# Patient Record
Sex: FEMALE | Race: WHITE | NOT HISPANIC OR LATINO | Employment: FULL TIME | URBAN - METROPOLITAN AREA
[De-identification: names, ages, dates, MRNs, and addresses within clinical notes are randomized per-mention and may not be internally consistent; named-entity substitution may affect disease eponyms.]

---

## 2021-08-30 ENCOUNTER — OFFICE VISIT (OUTPATIENT)
Dept: NEUROSURGERY | Facility: CLINIC | Age: 21
End: 2021-08-30

## 2021-08-30 VITALS
SYSTOLIC BLOOD PRESSURE: 120 MMHG | WEIGHT: 173 LBS | BODY MASS INDEX: 29.53 KG/M2 | DIASTOLIC BLOOD PRESSURE: 74 MMHG | HEIGHT: 64 IN

## 2021-08-30 DIAGNOSIS — M51.16 LUMBAR DISC HERNIATION WITH RADICULOPATHY: Primary | ICD-10-CM

## 2021-08-30 PROCEDURE — 99204 OFFICE O/P NEW MOD 45 MIN: CPT | Performed by: NEUROLOGICAL SURGERY

## 2021-08-30 RX ORDER — MULTIPLE VITAMINS W/ MINERALS TAB 9MG-400MCG
1 TAB ORAL DAILY
COMMUNITY
End: 2022-05-31 | Stop reason: HOSPADM

## 2021-08-30 RX ORDER — MELOXICAM 15 MG/1
15 TABLET ORAL DAILY
COMMUNITY
Start: 2021-06-22 | End: 2022-03-08

## 2021-08-30 RX ORDER — METHOCARBAMOL 750 MG/1
750 TABLET, FILM COATED ORAL EVERY 8 HOURS
COMMUNITY
Start: 2021-06-22 | End: 2022-03-08

## 2021-08-30 RX ORDER — IBUPROFEN 200 MG
800 TABLET ORAL EVERY 6 HOURS PRN
COMMUNITY

## 2021-08-30 NOTE — PROGRESS NOTES
NAME: JAIME FRY   DOS: 2021  : 2000  PCP: Liz Varghese MD    Chief Complaint:    Chief Complaint   Patient presents with   • Lower Back & Left Leg Pain       History of Present Illness:  21 y.o. 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 and twisting at times    She is a daily smoker    PMHX  Allergies:  No Known Allergies  Medications    Current Outpatient Medications:   •  ibuprofen (ADVIL,MOTRIN) 200 MG tablet, Take 800 mg by mouth Every 6 (Six) Hours As Needed for Mild Pain ., Disp: , Rfl:   •  meloxicam (MOBIC) 15 MG tablet, Take 15 mg by mouth Daily., Disp: , Rfl:   •  methocarbamol (ROBAXIN) 750 MG tablet, Take 750 mg by mouth Every 8 (Eight) Hours., Disp: , Rfl:   •  multivitamin with minerals (MULTIVITAMIN ADULT PO), Take 1 tablet by mouth Daily., Disp: , Rfl:   Past Medical History:  Past Medical History:   Diagnosis Date   • Asthma    • Low back pain      Past Surgical History:  Past Surgical History:   Procedure Laterality Date   • TONSILLECTOMY AND ADENOIDECTOMY       Social Hx:  Social History     Tobacco Use   • Smoking status: Current Every Day Smoker     Packs/day: 1.00   • Smokeless tobacco: Never Used   Substance Use Topics   • Alcohol use: Never   • Drug use: Never     Family Hx:  Family History   Family history unknown: Yes     Review of Systems:        Review of Systems   Constitutional: Positive for activity change. Negative for appetite change, chills, diaphoresis, fatigue, fever and unexpected weight change.   HENT: Negative for congestion, dental problem, drooling, ear discharge,  ear pain, facial swelling, hearing loss, mouth sores, nosebleeds, postnasal drip, rhinorrhea, sinus pressure, sinus pain, sneezing, sore throat, tinnitus, trouble swallowing and voice change.    Eyes: Negative for photophobia, pain, discharge, redness, itching and visual disturbance.   Respiratory: Negative for apnea, cough, choking, chest tightness, shortness of breath, wheezing and stridor.    Cardiovascular: Negative for chest pain, palpitations and leg swelling.   Gastrointestinal: Negative for abdominal distention, abdominal pain, anal bleeding, blood in stool, constipation, diarrhea, nausea, rectal pain and vomiting.   Endocrine: Negative for cold intolerance, heat intolerance, polydipsia, polyphagia and polyuria.   Genitourinary: Negative for decreased urine volume, difficulty urinating, dyspareunia, dysuria, enuresis, flank pain, frequency, genital sores, hematuria, menstrual problem, pelvic pain, urgency, vaginal bleeding, vaginal discharge and vaginal pain.   Musculoskeletal: Positive for back pain. Negative for arthralgias, gait problem, joint swelling, myalgias, neck pain and neck stiffness.   Skin: Negative for color change, pallor, rash and wound.   Allergic/Immunologic: Negative for environmental allergies, food allergies and immunocompromised state.   Neurological: Negative for dizziness, tremors, seizures, syncope, facial asymmetry, speech difficulty, weakness, light-headedness, numbness and headaches.   Hematological: Negative for adenopathy. Does not bruise/bleed easily.   Psychiatric/Behavioral: Negative for agitation, behavioral problems, confusion, decreased concentration, dysphoric mood, hallucinations, self-injury, sleep disturbance and suicidal ideas. The patient is not nervous/anxious and is not hyperactive.    I have reviewed this note template and all pertinent parts of the review of systems social, family history, surgical history and medication list        Physical Examination:  Vitals:     08/30/21 1107   BP: 120/74      General Appearance:   Well developed, well nourished, well groomed, alert, and cooperative.  Neurological examination:  Neurologic Exam  Vital signs were reviewed and documented in the chart  Patient appeared in good neurologic function with normal comprehension fluent speech  Mood and affect are normal  Sense of smell deferred  Covid mask left in place  Muscle bulk and tone normal  5 out of 5 strength no motor drift  Gait normal intact  Negative Romberg  No clonus long tract signs or myelopathy    Reflexes symmetric trace throughout  No edema noted and extremities skin appears normal    Straight leg raise sign absent on the right positive on the left  No signs of intrinsic hip dysfunction  Back is without any lesions or abnormality  Feet are warm and well perfused        Review of Imaging/DATA:  I reviewed personally her MRI and interpreted the films she is got a right-sided eccentric disc at the L5-S1 area    She is got a very convincing moderate size left-sided L4-5 disc herniation with some clouding of the left lateral recess  Diagnoses/Plan:    Ms. Colmenares is a 21 y.o. female   There is a nice young 21-year-old female with a history of L4-5 likely ruptured disc annular tear with lateral recess syndrome and ongoing left-sided sciatic leg pain I talked about surgical, nonsurgical options including lumbar epidural steroid blocks etc. also explained the impact of Covid on her treatment plan she has no evidence of weakness or progressive symptoms but clearly is uncomfortable and having daily symptoms    I explained the importance of smoking cessation to the patient explaining that smoking decreases the efficacy of surgical therapies not to mention the general health risks.  In addition to this when contemplating fusion surgeries smoking decreases fusion rates and leads to poor outcome, and contributes to complication rate.    I explained the complicated nature of chronic  painful conditions as it pertains to the neural axis. I explained that relationship between the central nervous system and peripheral nervous system and that pain treatment needs to be multimodal including physical therapy, having a person to perform nonsurgical interventional pain management as well as oral medication therapy in an ongoing manner. Also talked about work and how pain can affect quality of life along that front    From my standpoint I think it is reasonable to offer a left L4-5 lumbar discectomy should she fail a lumbar epidural steroid block.  I cautioned her about magical fixes given her relatively young age and explained that some of these components of this pain will likely be chronic such as the back pain but if the pain in the leg is the predominant symptom she should do well with that    I explained the risk benefits expected outcome from surgery    We can make arrangements for LEFT L4-5 lumbar microdiscectomy should she fail lumbar epidural steroid block I will set a phone visit and follow-up

## 2021-09-01 ENCOUNTER — TELEPHONE (OUTPATIENT)
Dept: NEUROSURGERY | Facility: CLINIC | Age: 21
End: 2021-09-01

## 2021-09-01 NOTE — TELEPHONE ENCOUNTER
Caller: Sophiedaniel Luz Maria    Relationship: Self    Best call back number: [phone]    What was the call regarding:     PATIENT CALLED AS SHE HAS AN APPOINTMENT WITH 'S OFFICE ON 10/05 AND THEY WOULD LIKE TO SEE THE DISC OF HER MRI AT THE APPOINTMENT.     PATIENT STATES THAT AT HER 8/30 APPOINTMENT WITH  THAT THEY WERE GOING TO BE MAILING THAT DISC BACK TO HER.     SHE WOULD LIKE TO KNOW IF THAT DISC WILL ARRIVE TO HER BEFORE HER 10/05 APPOINTMENT.

## 2021-09-01 NOTE — TELEPHONE ENCOUNTER
Called: Luz Maria Colmenares    Relationship: Self    Best call back number: [phone]    What was the call regarding: ATTEMPTED TO CALL PATIENT TO INFORM OF PREVIOUS COMMUNICATIONS. NO ANSWER, LEFT A VOICEMAIL FOR CALL BACK.

## 2021-09-01 NOTE — TELEPHONE ENCOUNTER
Appears the scans have not been downloaded yet and therefore have not been mailed back to the patient.  Once downloaded, they will take 3-5 days to receive in the mail.  Dr. Andujar will be able to see them in the pt's chart after being downloaded.

## 2021-09-02 ENCOUNTER — DOCUMENTATION (OUTPATIENT)
Dept: NEUROSURGERY | Facility: CLINIC | Age: 21
End: 2021-09-02

## 2021-09-02 NOTE — TELEPHONE ENCOUNTER
Pt called: she was told by Dr. Andujar's office that she needed to bring the disc to her appt. She would like to know if she will have the disc in time for her appt on 10/5/21 w/Con? Please advise! Thanks!    Pt contact: [phone]  Best time to call: anytime

## 2021-09-02 NOTE — TELEPHONE ENCOUNTER
I have patients disc. Images are being scanned into the system today. Disc will be placed in the mail today.     Patient was notified and verbalized understanding.

## 2021-09-30 PROBLEM — M51.16 LUMBAR DISC DISEASE WITH RADICULOPATHY: Status: ACTIVE | Noted: 2021-09-30

## 2021-09-30 PROBLEM — M48.062 LUMBAR STENOSIS WITH NEUROGENIC CLAUDICATION: Status: ACTIVE | Noted: 2021-09-30

## 2021-11-01 NOTE — PROGRESS NOTES
"Chief Complaint: \"Pain in my lower back and in my left leg\"        History of Present Illness:   Patient: Ms. Luz Maria Colmenares, 21 y.o. female   Referring Physician: Dr. Sathish Sykes  Reason for Referral: Consultation for chronic intractable lower back and left lower extremity pain.   Pain History: Patient reports a longstanding history of lower back and left lower extremity pain that has been present for several months.  Patient reports that initially the pain was in the right paravertebral region and then shifted into the left side radiating into the left lower extremity consistent with left L5 radiculopathy.  MRI of the lumbar spine revealed a leftward disc herniation at L4-L5 contributing to left lateral recess stenosis providing clinical and radiological correlation.  There is a rightward disc protrusion at L5-S1.  Patient has failed to obtain pain relief with conservative measures including oral analgesics, physical therapy, to name a few. Luz Maria Colmenares underwent neurosurgical consultation with Dr. Sathish Sykes on 08/30/2021, and was found to be a potential surgical candidate for left L4-L5 lumbar discectomy if failure to obtain pain relief with interventional pain management measures.  Pain has progressed in intensity over the past months.   Pain Description: Constant pain with intermittent exacerbation, described as aching, sharp, and throbbing sensation.   Radiation of Pain: The pain radiates from the lower back/left hip into the lateral aspect of the thigh and posterior calf and into the dorsum of the left foot  Pain pattern: dermatomic   Pain intensity today: 8/10  Average pain intensity last week: 7/10  Pain intensity ranges from: 4/10 to 9/10  Aggravating factors: Pain increases with bending, twisting, standing, walking. Patient describes neurogenic claudication. Patient does not use a cane or walker  Alleviating factors: Pain decreases with sitting down, lying down, changing positions "   Associated Symptoms:   Patient reports pain, numbness, weakness in the left lower extremity. Patient denies right-sided symptoms  Patient denies any new bladder or bowel problems.   Patient denies difficulties with her balance or recent falls.   Pain interferes with her sleep causing sleep fragmentation    Review of previous therapies and additional medical records:  Luz Maria Colmenares has already failed the following measures, including:   Conservative Measures: Oral analgesics, topical analgesics, ice, heat, physical therapy   Interventional Measures: None  Surgical Measures: No history of previous lumbar spine or hip surgery   Luz Maria Colmenares underwent neurosurgical consultation with Dr. Sathish Sykes on 08/30/2021, and was found to be a potential surgical candidate for left L4-L5 lumbar discectomy if failure to obtain pain relief with interventional pain management measures.    Luz Maria Colmenares presents with significant comorbidities including asthma, former smoker (quit 1 month ago)  In terms of current analgesics, Luz Maria Colmenares takes: meloxicam, methocarbamol  I have reviewed Esa Report #[id number] (no CS) consistent with medication reconciliation.  SOAPP: Low Risk     Global Pain Scale 11-18  2021          Pain 20          Feelings 10          Clinical outcomes 10          Activities 15          GPS Total: 55            Review of Diagnostic Studies: I have reviewed the images with the patient as well as the reports, as follows;  MRI of the lumbar spine without contrast on 7/7/2021.  Vertebral body heights and alignment are preserved.  The conus is seen at L1 and has unremarkable appearance.  Axial imaging:  L1-L2, L2-L3, L3-L4: No significant canal or foraminal stenosis  L4-L5: Leftward broad-based disc protrusion that contacts the left L5 nerve root.  Facet hypertrophy.  Moderate canal stenosis  L5-S1: Rightward disc protrusion contacting the right S1 nerve root  Lumbar spine x-rays on  "6/22/2021.  Levoconvex scoliotic curvature of the lumbar spine.  Vertebral body heights are preserved.  Facet hypertrophy at L4-L5 and L5-S1    Review of Systems   Constitutional: Positive for activity change.   Musculoskeletal: Positive for back pain.   Neurological: Positive for numbness.   All other systems reviewed and are negative.        Patient Active Problem List   Diagnosis   • Lumbar disc disease with radiculopathy   • Lumbar stenosis with neurogenic claudication   • Insomnia due to medical condition   • Former smoker   • Muscle spasm       Past Medical History:   Diagnosis Date   • Asthma    • Low back pain          Past Surgical History:   Procedure Laterality Date   • TONSILLECTOMY AND ADENOIDECTOMY           Family History   Family history unknown: Yes         Social History     Socioeconomic History   • Marital status: Single   Tobacco Use   • Smoking status: Current Every Day Smoker     Packs/day: 1.00   • Smokeless tobacco: Never Used   Substance and Sexual Activity   • Alcohol use: Never   • Drug use: Never   • Sexual activity: Defer           Current Outpatient Medications:   •  ibuprofen (ADVIL,MOTRIN) 200 MG tablet, Take 800 mg by mouth Every 6 (Six) Hours As Needed for Mild Pain ., Disp: , Rfl:   •  meloxicam (MOBIC) 15 MG tablet, Take 15 mg by mouth Daily., Disp: , Rfl:   •  methocarbamol (ROBAXIN) 750 MG tablet, Take 750 mg by mouth Every 8 (Eight) Hours., Disp: , Rfl:   •  multivitamin with minerals (MULTIVITAMIN ADULT PO), Take 1 tablet by mouth Daily., Disp: , Rfl:   •  Probiotic Product (PROBIOTIC PO), Take  by mouth., Disp: , Rfl:       No Known Allergies      /67 (BP Location: Left arm, Patient Position: Sitting, Cuff Size: Adult)   Pulse 61   Temp 98 °F (36.7 °C)   Ht 162.6 cm (64\")   Wt 77.5 kg (170 lb 12.8 oz)   SpO2 97%   BMI 29.32 kg/m²       Physical Exam:  Constitutional: Patient appears well-developed, well-nourished, well-hydrated  HEENT: Head: Normocephalic and " atraumatic  Eyes: Conjunctivae and lids are normal  Pupils: Equal, round, reactive to light  Neck: Trachea normal. Neck supple. No JVD present.   Peripheral vascular exam: Posterior tibialis: right 2+ and left 2+. Dorsalis pedis: right 2+ and left 2+. No edema.   Musculoskeletal   Gait and station: Gait evaluation demonstrated a normal gait. Able to walk on toes, heels (had pain in her leg and back), tandem walking   Lumbar spine: Passive and active range of motion are limited secondary to pain. Extension, left lateral flexion, rotation of the lumbar spine towards the left increased pain. Lumbar facet joint loading maneuvers are equivocal  Perfecto test and Gaenslen's test are negative   Piriformis maneuvers are negative   Palpation of the bilateral greater trochanter, unrevealing   Examination of the Iliotibial band: unrevealing   Hip joints: The range of motion of the hip joints is full and without pain   Neurological:   Patient is alert and oriented to person, place, and time.   Speech: Normal.   Cortical function: Normal mental status.   Reflex Scores:  Right patellar: 2+  Left patellar: 2+  Right Achilles: 2+  Left Achilles: 2+  Motor strength: 5/5  Motor Tone: Normal   Involuntary movements: None.   Superficial/Primitive Reflexes: Primitive reflexes were absent.   Right Ramirez: Absent  Left Ramirez: Absent  Right ankle clonus: Absent  Left ankle clonus: Absent   Babinsky: Absent  Long tract signs: Negative. Straight leg raising test: Negative on the right, positive on the left at 30 degrees with a positive Lasegue. Femoral stretch sign: Negative.   Sensory exam: Intact to light touch, intact pain and temperature sensation, intact vibration sensation and normal proprioception.   Coordination: Normal finger to nose and heel to shin. Normal balance and negative Romberg's sign   Skin and subcutaneous tissue: Skin is warm and intact. No rash noted. No cyanosis.   Psychiatric: Judgment and insight: Normal. Recent  and remote memory: Intact. Mood and affect: Normal.     ASSESSMENT:   1. Lumbar disc disease with radiculopathy    2. Lumbar stenosis with neurogenic claudication    3. Muscle spasm    4. BMI 29.0-29.9,adult    5. Former smoker    6. Insomnia due to medical condition          PLAN/MEDICAL DECISION MAKING:  Patient reports a longstanding history of lower back and left lower extremity pain that has been present for several months.  Patient reports that initially the pain was in the right paravertebral region and then shifted into the left side radiating into the left lower extremity consistent with left L4-L5 radiculopathy. MRI of the lumbar spine revealed a leftward disc herniation at L4-L5 contributing to severe left lateral recess stenosis providing clinical and radiological correlation.  There is a rightward disc protrusion at L5-S1. Patient has failed to obtain pain relief with conservative measures including oral analgesics, physical therapy, to name a few. Luz Maria Colmenares underwent neurosurgical consultation with Dr. Sathish Sykes on 08/30/2021, and was found to be a potential surgical candidate for left L4-L5 lumbar discectomy if failure to obtain pain relief with interventional pain management measures.  Pain has progressed in intensity over the past months. I have reviewed all available patient's medical records as well as previous therapies as referenced above. I had a lengthy conversation with Ms. Luz Maria Colmenares regarding her chronic pain condition and potential therapeutic options including risks, benefits, alternative therapies, to name a few. Therefore, I have proposed the following plan:  1. Diagnostic studies:   A. EMG/NCV of the bilateral lower extremities   B. UDS as patient will start treatment with controlled substances  2. Pharmacological measures: Reviewed and discussed;   A. Patient takes meloxicam, methocarbamol.  I have advised the patient to stop taking ibuprofen along with meloxicam  due to cumulative toxicity of taking 2 NSAIDs at the same time  B. Trial with gabapentin 100 mg 3 times per day.  Start 1 tablet at bedtime, and if tolerated increase to 3 times daily, #90, 1 refill  C. Trial with nortriptyline 10 mg 1-2 tablets at bedtime for treatment of nocturnal neuropathic pain and insomnia, #60, with 1 refill  D. Trial with Rheumate one tablet twice daily  E. Start pyridoxine 100 mg one tablet by mouth daily take for 30 days  F. Start alpha lipoid acid 4223-3346 mg per day divided into 3 doses  Screening and compliance with controlled substances:  Patient has completed a SOAPP and ORT questionnaires (revealing low risk). Esa report has been reviewed and appropriate.  Patient reports that she is not pregnant or planning to get pregnant anytime soon.  I have emphasized importance of informing us if her plans change so with that we can discontinue any medications.  Urine drug screening will be obtained today. Patient has signed a consent for treatment with controlled substances after reviewing the document and verbalizing full understanding of the risks, benefits, alternatives, and consequences of compliance and adherence with treatment and comprehensive plan of care. Patient received in hand a copy of this document.  3. Interventional pain management measures: Patient will be scheduled for diagnostic and therapeutic left L4-L5 and left L5-S1 transforaminal epidural steroid injections with the addition of hyaluronidase. We may repeat epidurals depending on patient's outcome.  Patient will follow-up with Dr. Sykes thereafter for possible left L4-L5 lumbar microdiscectomy  4. Long-term rehabilitation efforts:  A. The patient does not have a history of falls. I did complete a risk assessment for falls  B. Patient will start a comprehensive physical therapy program for core strengthening, gait and balance training, neurodynamics, myofascial release, cupping, dry needling and Water therapy or  Pramod   C. Start an exercise program such as water therapy and swimming  D. Contrast therapy: Apply ice-packs for 15-20 minutes, followed by heating pads for 15-20 minutes to affected area   E. Patient's Body mass index is 29.32 kg/m². Patient counseled on the importance of weight loss to help with overall health and pain control.   5. The patient has been instructed to contact my office with any questions or difficulties. The patient understands the plan and agrees to proceed accordingly.      Patient Care Team:  Liz Varghese MD as PCP - General (Family Medicine)  Liz Varghese MD as Referring Physician (Family Medicine)     No orders of the defined types were placed in this encounter.        No future appointments.      Juan Manuel Andujar MD     EMR Dragon/Transcription disclaimer:  Much of this encounter note is an electronic transcription of spoken language to printed text. Electronic transcription of spoken language may permit erroneous, or at times, nonsensical words or phrases to be inadvertently transcribed. Although I have reviewed the note for such errors, some may still exist.

## 2021-11-18 ENCOUNTER — TELEPHONE (OUTPATIENT)
Dept: PAIN MEDICINE | Facility: CLINIC | Age: 21
End: 2021-11-18

## 2021-11-18 ENCOUNTER — LAB (OUTPATIENT)
Dept: LAB | Facility: HOSPITAL | Age: 21
End: 2021-11-18

## 2021-11-18 ENCOUNTER — OFFICE VISIT (OUTPATIENT)
Dept: PAIN MEDICINE | Facility: CLINIC | Age: 21
End: 2021-11-18

## 2021-11-18 VITALS
WEIGHT: 170.8 LBS | BODY MASS INDEX: 29.16 KG/M2 | HEIGHT: 64 IN | SYSTOLIC BLOOD PRESSURE: 108 MMHG | OXYGEN SATURATION: 97 % | HEART RATE: 61 BPM | DIASTOLIC BLOOD PRESSURE: 67 MMHG | TEMPERATURE: 98 F

## 2021-11-18 DIAGNOSIS — G47.01 INSOMNIA DUE TO MEDICAL CONDITION: ICD-10-CM

## 2021-11-18 DIAGNOSIS — Z87.891 FORMER SMOKER: ICD-10-CM

## 2021-11-18 DIAGNOSIS — M48.062 LUMBAR STENOSIS WITH NEUROGENIC CLAUDICATION: ICD-10-CM

## 2021-11-18 DIAGNOSIS — Z51.81 THERAPEUTIC DRUG MONITORING: ICD-10-CM

## 2021-11-18 DIAGNOSIS — M51.16 LUMBAR DISC DISEASE WITH RADICULOPATHY: Primary | ICD-10-CM

## 2021-11-18 DIAGNOSIS — M51.16 LUMBAR DISC DISEASE WITH RADICULOPATHY: ICD-10-CM

## 2021-11-18 DIAGNOSIS — M62.838 MUSCLE SPASM: ICD-10-CM

## 2021-11-18 LAB
AMPHET+METHAMPHET UR QL: NEGATIVE
AMPHETAMINES UR QL: NEGATIVE
BARBITURATES UR QL SCN: NEGATIVE
BENZODIAZ UR QL SCN: NEGATIVE
BUPRENORPHINE SERPL-MCNC: NEGATIVE NG/ML
CANNABINOIDS SERPL QL: POSITIVE
COCAINE UR QL: NEGATIVE
METHADONE UR QL SCN: NEGATIVE
OPIATES UR QL: NEGATIVE
OXYCODONE UR QL SCN: NEGATIVE
PCP UR QL SCN: NEGATIVE
PROPOXYPH UR QL: NEGATIVE
TRICYCLICS UR QL SCN: NEGATIVE

## 2021-11-18 PROCEDURE — G0480 DRUG TEST DEF 1-7 CLASSES: HCPCS

## 2021-11-18 PROCEDURE — 80306 DRUG TEST PRSMV INSTRMNT: CPT

## 2021-11-18 PROCEDURE — 99204 OFFICE O/P NEW MOD 45 MIN: CPT | Performed by: ANESTHESIOLOGY

## 2021-11-18 RX ORDER — GABAPENTIN 100 MG/1
CAPSULE ORAL
Qty: 90 CAPSULE | Refills: 1 | Status: SHIPPED | OUTPATIENT
Start: 2021-11-18 | End: 2022-03-08

## 2021-11-18 RX ORDER — MULTIVITAMIN WITH IRON
100 TABLET ORAL DAILY
Qty: 30 TABLET | Refills: 0 | Status: SHIPPED | OUTPATIENT
Start: 2021-11-18 | End: 2022-03-08

## 2021-11-18 RX ORDER — NORTRIPTYLINE HYDROCHLORIDE 10 MG/1
CAPSULE ORAL
Qty: 60 CAPSULE | Refills: 1 | Status: SHIPPED | OUTPATIENT
Start: 2021-11-18 | End: 2022-03-08

## 2021-11-18 RX ORDER — ST. JOHN'S WORT 300 MG
400 CAPSULE ORAL 3 TIMES DAILY
Qty: 180 CAPSULE | Refills: 5 | Status: SHIPPED | OUTPATIENT
Start: 2021-11-18 | End: 2022-03-08

## 2021-11-18 RX ORDER — ME-TETRAHYDROFOLATE/B12/HRB236 1-1-500 MG
1 CAPSULE ORAL DAILY
Qty: 90 CAPSULE | Refills: 1 | Status: SHIPPED | OUTPATIENT
Start: 2021-11-18 | End: 2022-03-08

## 2021-11-18 NOTE — TELEPHONE ENCOUNTER
Spoke with pt and advised to give the office till this evening or tomorrow to get her medication sent over to her pharmacy. Pt understood.

## 2021-11-18 NOTE — TELEPHONE ENCOUNTER
Caller: Luz Maria Colmenares    Relationship to patient: Self    Best call back number:[phone]    Patient is needing: PATIENT STATES HER PHARMACY HAS NOT RECEIVED THE ORDER FOR HER GABAPENTIN SHE WAS VERIFYING IT WAS SENT TO CORRECT PHARMACY - [facility] [place], [phone]

## 2021-11-29 ENCOUNTER — OUTSIDE FACILITY SERVICE (OUTPATIENT)
Dept: PAIN MEDICINE | Facility: CLINIC | Age: 21
End: 2021-11-29

## 2021-11-29 PROCEDURE — 64484 NJX AA&/STRD TFRM EPI L/S EA: CPT | Performed by: ANESTHESIOLOGY

## 2021-11-29 PROCEDURE — 64483 NJX AA&/STRD TFRM EPI L/S 1: CPT | Performed by: ANESTHESIOLOGY

## 2021-11-29 PROCEDURE — 99152 MOD SED SAME PHYS/QHP 5/>YRS: CPT | Performed by: ANESTHESIOLOGY

## 2021-11-30 ENCOUNTER — TELEPHONE (OUTPATIENT)
Dept: PAIN MEDICINE | Facility: CLINIC | Age: 21
End: 2021-11-30

## 2021-11-30 LAB — CANNABINOIDS UR QL CFM: NEGATIVE

## 2021-11-30 NOTE — TELEPHONE ENCOUNTER
LVM for pt regarding how they’re feeling after yesterday’s procedure with Dr. Andujar. Advised of f/u and pt to contact us if needed.

## 2021-12-27 ENCOUNTER — TELEPHONE (OUTPATIENT)
Dept: PAIN MEDICINE | Facility: CLINIC | Age: 21
End: 2021-12-27

## 2021-12-27 NOTE — TELEPHONE ENCOUNTER
Pt called stating that she feels she is nauseous and unable to eat/dink anything. Pt stated that she believes it is the medicine she is taking, which is gabapentin and nortriptyline. Pt stated that she could tell the change once she started these medications, she is leery of the side effects.I advised pt that I would send a message to Dr. Andujar, but that he is out of office til 1/3/22.

## 2021-12-28 NOTE — TELEPHONE ENCOUNTER
Spoke wit pt and advised that the pt can stop taking the nortriptyline and that she needs to wean off of the gabapentin. Pt stated that she hasn't taken the gabapentin all weekend and she feels better. She is wondering if there is something else that she can try?

## 2021-12-29 NOTE — TELEPHONE ENCOUNTER
Spoke with pt and advised her that Dr. Andujar recommended she try Tylenol and OTC nsaids. Pt stated she would give those a try. No further needs expressed.

## 2022-01-12 ENCOUNTER — HOSPITAL ENCOUNTER (OUTPATIENT)
Dept: NEUROLOGY | Facility: HOSPITAL | Age: 22
Discharge: HOME | End: 2022-01-12
Admitting: ANESTHESIOLOGY

## 2022-01-12 DIAGNOSIS — M51.16 LUMBAR DISC DISEASE WITH RADICULOPATHY: ICD-10-CM

## 2022-01-12 PROCEDURE — 95886 MUSC TEST DONE W/N TEST COMP: CPT

## 2022-01-12 PROCEDURE — 95911 NRV CNDJ TEST 9-10 STUDIES: CPT

## 2022-01-12 PROCEDURE — 95911 NRV CNDJ TEST 9-10 STUDIES: CPT | Performed by: PSYCHIATRY & NEUROLOGY

## 2022-01-12 PROCEDURE — 95886 MUSC TEST DONE W/N TEST COMP: CPT | Performed by: PSYCHIATRY & NEUROLOGY

## 2022-01-28 ENCOUNTER — TELEPHONE (OUTPATIENT)
Dept: PAIN MEDICINE | Facility: CLINIC | Age: 22
End: 2022-01-28

## 2022-01-28 NOTE — TELEPHONE ENCOUNTER
Caller: JAIME FRY    Relationship: SELF     Best call back number: [phone]    What was the call regarding: PATIENT  NEEDS TO RESCHEDULE 01/18 APPT ; [facility] AVAILABLE IS 03/07- PATEINT STATED SHE TESTED POSITIVE FOR COVID LAST Saturday; OUT OF QUARANTINE & NO SYMPTOMS     Do you require a callback: YES

## 2022-03-07 NOTE — PROGRESS NOTES
"Chief Complaint: \"I did not get any relief with the epidural\"        History of Present Illness:   Patient: Ms. Luz Maria Colmenares, 21 y.o. female originally referred by Dr. Sathish Sykes in consultation for chronic intractable lower back and left lower extremity pain.  We last saw her on November 29, 2021, when she underwent diagnostic and therapeutic left L4-L5 and left L5-S1 transforaminal epidural steroid injections with the addition of hyaluronidase, from which she reports experiencing no pain relief or reduction in her pain levels.  She continues to experience left lower extremity symptoms extending into her left foot. She did not begin physical therapy.  She was started on a trial of gabapentin 100 mg 3 times daily, as well as nortriptyline at night from which she experienced side effects and discontinued medication.  She underwent electrodiagnostic studies of the bilateral lower extremities which was normal.  She returns today for post procedure follow-up and evaluation.  Pain Description: Constant pain with intermittent exacerbation, described as aching, sharp, and throbbing sensation.   Radiation of Pain: The pain radiates from the lower back/left hip into the lateral aspect of the thigh and posterior calf and into the dorsum of the left foot  Pain intensity today: 8/10  Average pain intensity last week: 7/10  Pain intensity ranges from: 6/10 to 9/10  Aggravating factors: Pain increases with bending, twisting, standing, walking. Patient describes neurogenic claudication.   Alleviating factors: Pain decreases with sitting down, lying down, changing positions   Associated Symptoms:   Patient reports pain, numbness, weakness in the left lower extremity. Patient denies right-sided symptoms  Patient denies any new bladder or bowel problems.   Patient denies difficulties with her balance or recent falls.       Review of previous therapies and additional medical records:  Luz Maria Colmenares has already failed " the following measures, including:   Conservative Measures: Oral analgesics, topical analgesics, ice, heat, physical therapy   Interventional Measures: 11/29/2021: DxTx left L4-L5 and left L5-S1 transforaminal epidural steroid injections with the addition of hyaluronidase  Surgical Measures: No history of previous lumbar spine or hip surgery   Luz Maria Colmenares underwent neurosurgical consultation with Dr. Sathish Sykes on 08/30/2021, and was found to be a potential surgical candidate for left L4-L5 lumbar discectomy if failure to obtain pain relief with interventional pain management measures.    Luz Maria Colmenares presents with significant comorbidities including asthma, former smoker (quit several months ago)  In terms of current analgesics, Luz Maria Colmenares takes: meloxicam, methocarbamol  I have reviewed Esa Report is consistent with medication reconciliation.  SOAPP: Low Risk     Global Pain Scale 11-18  2021          Pain 20          Feelings 10          Clinical outcomes 10          Activities 15          GPS Total: 55            Review of New Diagnostic Studies:  EMG/NCV of the bilateral lower extremities 1/12/2022: Normal.    Review of Diagnostic Studies:   MRI of the lumbar spine without contrast on 7/7/2021: Imaging was reviewed.  Vertebral body heights are well-maintained without evidence of malalignment.    L1-L2, L2-L3, L3-L4: No significant canal or foraminal stenosis  L4-L5: Leftward broad-based disc protrusion that contacts the left L5 nerve root.  Facet hypertrophy.  Moderate canal stenosis  L5-S1: Rightward disc protrusion contacting the right S1 nerve root  Lumbar spine x-rays on 6/22/2021: Imaging was reviewed.  Levoconvex scoliotic curvature of the lumbar spine.  Vertebral body heights are preserved.  Facet hypertrophy at L4-L5 and L5-S1    Review of Systems   Constitutional: Positive for activity change.   Musculoskeletal: Positive for back pain.   Neurological: Positive for numbness.  "  All other systems reviewed and are negative.        Patient Active Problem List   Diagnosis   • Lumbar disc disease with radiculopathy   • Lumbar stenosis with neurogenic claudication   • Insomnia due to medical condition   • Former smoker   • Muscle spasm       Past Medical History:   Diagnosis Date   • Asthma    • Low back pain          Past Surgical History:   Procedure Laterality Date   • TONSILLECTOMY AND ADENOIDECTOMY           Family History   Family history unknown: Yes         Social History     Socioeconomic History   • Marital status: Single   Tobacco Use   • Smoking status: Former Smoker     Packs/day: 1.00   • Smokeless tobacco: Never Used   Vaping Use   • Vaping Use: Every day   • Devices: Disposable   Substance and Sexual Activity   • Alcohol use: Never   • Drug use: Never   • Sexual activity: Defer           Current Outpatient Medications:   •  ibuprofen (ADVIL,MOTRIN) 200 MG tablet, Take 800 mg by mouth Every 6 (Six) Hours As Needed for Mild Pain ., Disp: , Rfl:   •  multivitamin with minerals tablet tablet, Take 1 tablet by mouth Daily., Disp: , Rfl:   •  ProAir  (90 Base) MCG/ACT inhaler, INHALE 2 PUFFS BY MOUTH BY MOUTH EVERY 6 HOURS AS NEEDED FOR SHORTNESS OF BREATH OR WHEEZING, Disp: , Rfl:       No Known Allergies      /68   Pulse 79   Temp 96.8 °F (36 °C)   Ht 162.6 cm (64\")   Wt 76.7 kg (169 lb 3.2 oz)   SpO2 100%   BMI 29.04 kg/m²       Physical Exam:  Constitutional: Patient appears well-developed, well-nourished, well-hydrated  HEENT: Head: Normocephalic and atraumatic  Eyes: Conjunctivae and lids are normal  Pupils: Equal, round, reactive to light  Neck: Trachea normal. Neck supple. No JVD present.   Peripheral vascular exam: Posterior tibialis: right 2+ and left 2+. Dorsalis pedis: right 2+ and left 2+. No edema.   Musculoskeletal   Gait and station: Gait evaluation demonstrated a normal gait.  Lumbar spine: Passive and active range of motion are limited secondary " to pain. Extension and rotation of the lumbar spine towards the left increased and reproduced pain. Lumbar facet joint loading maneuvers are equivocal  Perfecto test and Gaenslen's test are negative   Piriformis maneuvers are negative   Palpation of the bilateral greater trochanter, unrevealing   Examination of the Iliotibial band: unrevealing   Hip joints: The range of motion of the hip joints is full and without pain   Neurological:   Patient is alert and oriented to person, place, and time.   Speech: Normal.   Cortical function: Normal mental status.   Reflex Scores:  Right patellar: 2+  Left patellar: 2+  Right Achilles: 2+  Left Achilles: 2+  Motor strength: 5/5  Motor Tone: Normal   Involuntary movements: None.   Superficial/Primitive Reflexes: Primitive reflexes were absent.   Right Ramirez: Absent  Left Ramirez: Absent  Right ankle clonus: Absent  Left ankle clonus: Absent   Babinsky: Absent  Long tract signs: Negative. Straight leg raising test: Positive on the left. Femoral stretch sign: Negative.   Sensory exam: Intact to light touch, intact pain and temperature sensation, intact vibration sensation and normal proprioception.   Coordination: Normal finger to nose and heel to shin. Normal balance and negative Romberg's sign   Skin and subcutaneous tissue: Skin is warm and intact. No rash noted. No cyanosis.   Psychiatric: Judgment and insight: Normal. Recent and remote memory: Intact. Mood and affect: Normal.     ASSESSMENT:   1. Lumbar disc disease with radiculopathy    2. Lumbar stenosis with neurogenic claudication    3. BMI 29.0-29.9,adult    4. Former smoker          PLAN/MEDICAL DECISION MAKING:  Patient reports a longstanding history of lower back pain and a more recent history of left lower extremity pain. MRI of the lumbar spine revealed a leftward disc herniation at L4-L5 contributing to severe left lateral recess stenosis providing clinical and radiological correlation.  There is a rightward disc  protrusion at L5-S1. Patient has failed to obtain pain relief with conservative measures including oral analgesics, physical therapy, to name a few. Luz Maria Colmenares underwent neurosurgical consultation with Dr. Sathish Sykes on 08/30/2021, and was found to be a potential surgical candidate for left L4-L5 lumbar discectomy if failure to obtain pain relief with interventional pain management measures.  Unfortunately, she did not find any reduction of pain from epidural, as referenced under HPI.  Therefore, due to no reported relief I did not recommend a another epidural.  She has quite exhausted conservatism at this point.  She is going to follow-up with Dr. Sykes.  I have reviewed all available patient's medical records as well as previous therapies as referenced above. I had a lengthy conversation with Ms. Luz Maria Colmenares regarding her chronic pain condition and potential therapeutic options including risks, benefits, alternative therapies, to name a few. Therefore, I have proposed the following plan:  1. Pharmacological measures: Reviewed and discussed;   A. Patient takes ibuprofen and methocarbamol.    2. Interventional pain management measures: None indicated at this time.  Patient is going to follow-up with Dr. Sykes for further recommendations, I will be happy to evaluate her in the future if warranted.    3. Long-term rehabilitation efforts:  A. The patient does not have a history of falls. I did complete a risk assessment for falls  B. Start an exercise program such as water therapy and swimming  C. Contrast therapy: Apply ice-packs for 15-20 minutes, followed by heating pads for 15-20 minutes to affected area   D. Patient's Body mass index is 29.32 kg/m². Patient counseled on the importance of weight loss to help with overall health and pain control.   4. The patient has been instructed to contact my office with any questions or difficulties. The patient understands the plan and agrees to proceed  accordingly.      Patient Care Team:  Liz Varghese MD as PCP - General (Family Medicine)  Liz Varghese MD as Referring Physician (Family Medicine)     No orders of the defined types were placed in this encounter.        No future appointments.      FAM Sal

## 2022-03-08 ENCOUNTER — OFFICE VISIT (OUTPATIENT)
Dept: PAIN MEDICINE | Facility: CLINIC | Age: 22
End: 2022-03-08

## 2022-03-08 VITALS
BODY MASS INDEX: 28.89 KG/M2 | DIASTOLIC BLOOD PRESSURE: 68 MMHG | SYSTOLIC BLOOD PRESSURE: 108 MMHG | TEMPERATURE: 96.8 F | HEIGHT: 64 IN | HEART RATE: 79 BPM | OXYGEN SATURATION: 100 % | WEIGHT: 169.2 LBS

## 2022-03-08 DIAGNOSIS — Z87.891 FORMER SMOKER: ICD-10-CM

## 2022-03-08 DIAGNOSIS — M51.16 LUMBAR DISC DISEASE WITH RADICULOPATHY: ICD-10-CM

## 2022-03-08 DIAGNOSIS — M48.062 LUMBAR STENOSIS WITH NEUROGENIC CLAUDICATION: ICD-10-CM

## 2022-03-08 PROCEDURE — 99213 OFFICE O/P EST LOW 20 MIN: CPT | Performed by: NURSE PRACTITIONER

## 2022-04-04 ENCOUNTER — TELEPHONE (OUTPATIENT)
Dept: NEUROSURGERY | Facility: CLINIC | Age: 22
End: 2022-04-04

## 2022-04-04 DIAGNOSIS — M51.16 LUMBAR DISC HERNIATION WITH RADICULOPATHY: Primary | ICD-10-CM

## 2022-04-04 NOTE — TELEPHONE ENCOUNTER
Spoke with pt. She had a left L4-5, L5-S1 injection with Dr. Andujar with no relief. She is still having low back and left leg pain that is gradually worsening. Dr. Sykes would like to repeat an MRI and see her in the office.  Pt aware that you will contact her tomorrow.

## 2022-04-13 ENCOUNTER — HOSPITAL ENCOUNTER (OUTPATIENT)
Dept: MRI IMAGING | Facility: HOSPITAL | Age: 22
Discharge: HOME | End: 2022-04-13
Admitting: NEUROLOGICAL SURGERY

## 2022-04-13 DIAGNOSIS — M51.16 LUMBAR DISC HERNIATION WITH RADICULOPATHY: ICD-10-CM

## 2022-04-13 PROCEDURE — 72148 MRI LUMBAR SPINE W/O DYE: CPT

## 2022-05-12 ENCOUNTER — OFFICE VISIT (OUTPATIENT)
Dept: NEUROSURGERY | Facility: CLINIC | Age: 22
End: 2022-05-12

## 2022-05-12 ENCOUNTER — PREP FOR SURGERY (OUTPATIENT)
Dept: OTHER | Facility: HOSPITAL | Age: 22
End: 2022-05-12

## 2022-05-12 VITALS
BODY MASS INDEX: 28.99 KG/M2 | DIASTOLIC BLOOD PRESSURE: 60 MMHG | SYSTOLIC BLOOD PRESSURE: 112 MMHG | WEIGHT: 169.8 LBS | HEIGHT: 64 IN | TEMPERATURE: 97.7 F

## 2022-05-12 DIAGNOSIS — Z72.0 TOBACCO ABUSE: ICD-10-CM

## 2022-05-12 DIAGNOSIS — M51.16 LUMBAR DISC HERNIATION WITH RADICULOPATHY: Primary | ICD-10-CM

## 2022-05-12 DIAGNOSIS — M51.36 DDD (DEGENERATIVE DISC DISEASE), LUMBAR: ICD-10-CM

## 2022-05-12 DIAGNOSIS — M53.86 SCIATICA ASSOCIATED WITH DISORDER OF LUMBAR SPINE: Primary | ICD-10-CM

## 2022-05-12 PROCEDURE — 99214 OFFICE O/P EST MOD 30 MIN: CPT | Performed by: NEUROLOGICAL SURGERY

## 2022-05-12 RX ORDER — SODIUM CHLORIDE, SODIUM LACTATE, POTASSIUM CHLORIDE, CALCIUM CHLORIDE 600; 310; 30; 20 MG/100ML; MG/100ML; MG/100ML; MG/100ML
9 INJECTION, SOLUTION INTRAVENOUS CONTINUOUS
Status: CANCELLED | OUTPATIENT
Start: 2022-05-12

## 2022-05-12 RX ORDER — CEFAZOLIN SODIUM 2 G/100ML
2 INJECTION, SOLUTION INTRAVENOUS ONCE
Status: CANCELLED | OUTPATIENT
Start: 2022-05-12 | End: 2022-05-12

## 2022-05-12 RX ORDER — CHLORHEXIDINE GLUCONATE 4 G/100ML
SOLUTION TOPICAL
Qty: 120 ML | Refills: 0 | Status: SHIPPED | OUTPATIENT
Start: 2022-05-12 | End: 2022-05-31 | Stop reason: HOSPADM

## 2022-05-12 RX ORDER — HYDROCODONE BITARTRATE AND ACETAMINOPHEN 7.5; 325 MG/1; MG/1
1 TABLET ORAL ONCE
Status: CANCELLED | OUTPATIENT
Start: 2022-05-12 | End: 2022-05-12

## 2022-05-12 RX ORDER — FAMOTIDINE 20 MG/1
20 TABLET, FILM COATED ORAL
Status: CANCELLED | OUTPATIENT
Start: 2022-05-12

## 2022-05-12 RX ORDER — IBUPROFEN 800 MG/1
800 TABLET ORAL ONCE
Status: CANCELLED | OUTPATIENT
Start: 2022-05-12 | End: 2022-05-12

## 2022-05-12 RX ORDER — ACETAMINOPHEN 325 MG/1
650 TABLET ORAL ONCE
Status: CANCELLED | OUTPATIENT
Start: 2022-05-12 | End: 2022-05-12

## 2022-05-12 RX ORDER — TRAMADOL HYDROCHLORIDE 50 MG/1
50 TABLET ORAL 2 TIMES DAILY PRN
Qty: 30 TABLET | Refills: 0 | Status: SHIPPED | OUTPATIENT
Start: 2022-05-12 | End: 2023-02-20

## 2022-05-12 NOTE — PROGRESS NOTES
NAME: JAIME FRY   DOS: 2022  : 2000  PCP: Liz Varghese MD    Chief Complaint:    Chief Complaint   Patient presents with   • Low back & left leg pain       History of Present Illness:  22 y.o. female   /22-year-old back in neurosurgical follow-up she is well-known to me for history of a left L4-5 disc.  She elected nonsurgical care and we tried a lumbar epidural block initially because she had gotten better    She is had recurrence of her pain is gotten worse she is here with follow-up MRIs.  She continues to smoke    PMHX  Allergies:  No Known Allergies  Medications    Current Outpatient Medications:   •  ibuprofen (ADVIL,MOTRIN) 200 MG tablet, Take 800 mg by mouth Every 6 (Six) Hours As Needed for Mild Pain ., Disp: , Rfl:   •  multivitamin with minerals tablet tablet, Take 1 tablet by mouth Daily., Disp: , Rfl:   •  ProAir  (90 Base) MCG/ACT inhaler, INHALE 2 PUFFS BY MOUTH BY MOUTH EVERY 6 HOURS AS NEEDED FOR SHORTNESS OF BREATH OR WHEEZING, Disp: , Rfl:   Past Medical History:  Past Medical History:   Diagnosis Date   • Asthma    • Low back pain      Past Surgical History:  Past Surgical History:   Procedure Laterality Date   • EPIDURAL BLOCK  2021   • TONSILLECTOMY AND ADENOIDECTOMY       Social Hx:  Social History     Tobacco Use   • Smoking status: Current Every Day Smoker     Packs/day: 1.00     Years: 5.00     Pack years: 5.00     Types: Electronic Cigarette   • Smokeless tobacco: Never Used   Vaping Use   • Vaping Use: Every day   • Devices: Disposable   Substance Use Topics   • Alcohol use: Never   • Drug use: Never     Family Hx:  Family History   Family history unknown: Yes     Review of Systems:        Review of Systems   Constitutional: Negative for activity change, appetite change, chills, diaphoresis, fatigue, fever and unexpected weight change.   HENT: Negative for congestion, dental problem, drooling, ear discharge, ear pain, facial swelling, hearing  loss, mouth sores, nosebleeds, postnasal drip, rhinorrhea, sinus pressure, sinus pain, sneezing, sore throat, tinnitus, trouble swallowing and voice change.    Eyes: Negative for photophobia, pain, discharge, redness, itching and visual disturbance.   Respiratory: Negative for apnea, cough, choking, chest tightness, shortness of breath, wheezing and stridor.    Cardiovascular: Negative for chest pain, palpitations and leg swelling.   Gastrointestinal: Negative for abdominal distention, abdominal pain, anal bleeding, blood in stool, constipation, diarrhea, nausea, rectal pain and vomiting.   Endocrine: Negative for cold intolerance, heat intolerance, polydipsia, polyphagia and polyuria.   Genitourinary: Negative for decreased urine volume, difficulty urinating, dyspareunia, dysuria, enuresis, flank pain, frequency, genital sores, hematuria, menstrual problem, pelvic pain, urgency, vaginal bleeding, vaginal discharge and vaginal pain.   Musculoskeletal: Positive for back pain. Negative for arthralgias, gait problem, joint swelling, myalgias, neck pain and neck stiffness.   Skin: Negative for color change, pallor, rash and wound.   Allergic/Immunologic: Negative for environmental allergies, food allergies and immunocompromised state.   Neurological: Negative for dizziness, tremors, seizures, syncope, facial asymmetry, speech difficulty, weakness, light-headedness, numbness and headaches.   Hematological: Negative for adenopathy. Does not bruise/bleed easily.   Psychiatric/Behavioral: Negative for agitation, behavioral problems, confusion, decreased concentration, dysphoric mood, hallucinations, self-injury, sleep disturbance and suicidal ideas. The patient is not nervous/anxious and is not hyperactive.    I have reviewed this note template and all pertinent parts of the review of systems social, family history, surgical history and medication list        Physical Examination:  Vitals:    05/12/22 1436   BP: 112/60    Temp: 97.7 °F (36.5 °C)      General Appearance:   Well developed, well nourished, well groomed, alert, and cooperative.  Neurological examination:  Neurologic Exam  Vital signs were reviewed and documented in the chart  Patient appeared in good neurologic function with normal comprehension fluent speech  Mood and affect are normal      Muscle bulk and tone normal  5 out of 5 strength no motor drift  Gait normal intact  Negative Romberg  No clonus long tract signs or myelopathy  Positive straight leg raise sign left reflexes symmetric  No edema noted and extremities skin appears normal    Straight leg raise sign absent right  No signs of intrinsic hip dysfunction  Back is without any lesions or abnormality  Legs are warm and well-perfused        Review of Imaging/DATA:  I saw her MRI and compared the old and the new she has an current enlarging left L4-5 lumbar disc with severe high-grade lateral recess stenosis at the 4 5 area  Diagnoses/Plan:    Ms. Colmenares is a 22 y.o. female   1.  Lumbar degenerative disc disease L4-5 with enlarging left L4-5 disc failure of lumbar epidural steroid block with enlargement on scans she is quite miserable    We will make arrangements for a left L4-5 lumbar microdiscectomy  I explained the risk benefits and expected outcome of major elective surgery for their problem, complications from approach, and infection, the risk of neurologic implications after surgery as well as need for repeat surgeries and most importantly failure to achieve quality of life improvement from the surgery to the patient.  Explained the logic behind the surgery the recovery  I explained the importance of smoking cessation to the patient explaining that smoking decreases the efficacy of surgical therapies not to mention the general health risks.  In addition to this when contemplating fusion surgeries smoking decreases fusion rates and leads to poor outcome, and contributes to complication rate.

## 2022-05-17 ENCOUNTER — TELEPHONE (OUTPATIENT)
Dept: NEUROSURGERY | Facility: CLINIC | Age: 22
End: 2022-05-17

## 2022-05-17 NOTE — TELEPHONE ENCOUNTER
DOCUMENTATION ONLY: A PA has been sent in for the patients Tramadol.      Tramadol has been approved.  Patient notified.

## 2022-05-23 ENCOUNTER — PRE-ADMISSION TESTING (OUTPATIENT)
Dept: PREADMISSION TESTING | Facility: HOSPITAL | Age: 22
End: 2022-05-23

## 2022-05-23 VITALS — WEIGHT: 170.64 LBS | HEIGHT: 64 IN | BODY MASS INDEX: 29.13 KG/M2

## 2022-05-23 DIAGNOSIS — M53.86 SCIATICA ASSOCIATED WITH DISORDER OF LUMBAR SPINE: ICD-10-CM

## 2022-05-23 LAB
ANION GAP SERPL CALCULATED.3IONS-SCNC: 9 MMOL/L (ref 5–15)
BUN SERPL-MCNC: 18 MG/DL (ref 6–20)
BUN/CREAT SERPL: 27.7 (ref 7–25)
CALCIUM SPEC-SCNC: 9.8 MG/DL (ref 8.6–10.5)
CHLORIDE SERPL-SCNC: 103 MMOL/L (ref 98–107)
CO2 SERPL-SCNC: 28 MMOL/L (ref 22–29)
CREAT SERPL-MCNC: 0.65 MG/DL (ref 0.57–1)
DEPRECATED RDW RBC AUTO: 39.7 FL (ref 37–54)
EGFRCR SERPLBLD CKD-EPI 2021: 126.3 ML/MIN/1.73
ERYTHROCYTE [DISTWIDTH] IN BLOOD BY AUTOMATED COUNT: 12 % (ref 12.3–15.4)
GLUCOSE SERPL-MCNC: 78 MG/DL (ref 65–99)
HCT VFR BLD AUTO: 40.9 % (ref 34–46.6)
HGB BLD-MCNC: 14.1 G/DL (ref 12–15.9)
MCH RBC QN AUTO: 31.3 PG (ref 26.6–33)
MCHC RBC AUTO-ENTMCNC: 34.5 G/DL (ref 31.5–35.7)
MCV RBC AUTO: 90.9 FL (ref 79–97)
PLATELET # BLD AUTO: 286 10*3/MM3 (ref 140–450)
PMV BLD AUTO: 9.6 FL (ref 6–12)
POTASSIUM SERPL-SCNC: 4.1 MMOL/L (ref 3.5–5.2)
QT INTERVAL: 390 MS
QTC INTERVAL: 423 MS
RBC # BLD AUTO: 4.5 10*6/MM3 (ref 3.77–5.28)
SODIUM SERPL-SCNC: 140 MMOL/L (ref 136–145)
WBC NRBC COR # BLD: 10.54 10*3/MM3 (ref 3.4–10.8)

## 2022-05-23 PROCEDURE — 85027 COMPLETE CBC AUTOMATED: CPT

## 2022-05-23 PROCEDURE — 80048 BASIC METABOLIC PNL TOTAL CA: CPT

## 2022-05-23 PROCEDURE — 93010 ELECTROCARDIOGRAM REPORT: CPT | Performed by: INTERNAL MEDICINE

## 2022-05-23 PROCEDURE — 93005 ELECTROCARDIOGRAM TRACING: CPT

## 2022-05-23 PROCEDURE — 87081 CULTURE SCREEN ONLY: CPT

## 2022-05-23 PROCEDURE — 36415 COLL VENOUS BLD VENIPUNCTURE: CPT

## 2022-05-23 NOTE — PAT
An arrival time for procedure was not given during PAT visit. If patient had any questions or concerns about their arrival time, they were instructed to contact their surgeon/physician.  Additionally, if the patient referred to an arrival time that was acquired from their my chart account, patient was encouraged to verify that time with their surgeon/physician.  NO arrival times given in Pre Admission Testing Department.    Patient denies any current skin issues.     Patient to apply Chlorhexadine wipes  to surgical area (as instructed) the night before procedure and the AM of procedure. Wipes provided.    Bactroban and Chlorhexidine Prescription prescribed by physician before PAT visit.  Verified with patient that medications were picked up from their pharmacy.  Written instructions given to patient during PAT visit.  Patient/family also instructed to complete skin prep checklist and return the checklist on the day of surgery to preoperative staff.  Patient/family verbalized understanding.    Patient instructed to drink 20 ounces (or until full) of Gatorade and it needs to be completed 1 hour (for Main OR patients) or 2 hours (scheduled  section patients) before given arrival time for procedure (NO RED Gatorade)    Patient verbalized understanding.    Patient viewed general PAT education video as instructed in their preoperative information received from their surgeon.  Patient stated the general PAT education video was viewed in its entirety and survey completed.  Copies of PAT general education handouts (Incentive Spirometry, Meds to Beds Program, Patient Belongings, Pre-op skin preparation instructions, Blood Glucose testing, Visitor policy, Surgery FAQ, Code H) distributed to patient if not printed. Education related to the PAT pass and skin preparation for surgery (if applicable) completed in PAT as a reinforcement to PAT education video. Patient instructed to return PAT pass provided today as well as  completed skin preparation sheet (if applicable) on the day of procedure.     Additionally if patient had not viewed video yet but intended to view it at home or in our waiting area, then referred them to the handout with QR code/link provided during PAT visit.  Instructed patient to complete survey after viewing the video in its entirety.  Encouraged patient/family to read PAT general education handouts thoroughly and notify PAT staff with any questions or concerns. Patient verbalized understanding of all information and priority content.

## 2022-05-24 LAB — MRSA SPEC QL CULT: NORMAL

## 2022-05-26 ENCOUNTER — TELEPHONE (OUTPATIENT)
Dept: NEUROSURGERY | Facility: CLINIC | Age: 22
End: 2022-05-26

## 2022-05-26 NOTE — TELEPHONE ENCOUNTER
Provider: Onel   Surgery: Left-sided L4-5 lumbar microdiscectomy   Surgery Date:  05/31/2022  Last visit: Office Visit with Sathish Sykes MD (05/12/2022)     Next visit: Surgery    Reason for call:         Patient LVM wanting to know how long her recovery time will be for surgery so she can fill out the leave for work PW for her job.

## 2022-05-27 ENCOUNTER — ANESTHESIA EVENT (OUTPATIENT)
Dept: PERIOP | Facility: HOSPITAL | Age: 22
End: 2022-05-27

## 2022-05-27 RX ORDER — FAMOTIDINE 10 MG/ML
20 INJECTION, SOLUTION INTRAVENOUS ONCE
Status: CANCELLED | OUTPATIENT
Start: 2022-05-27 | End: 2022-05-27

## 2022-05-27 NOTE — TELEPHONE ENCOUNTER
This will depend on what she does for work. 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

## 2022-05-27 NOTE — TELEPHONE ENCOUNTER
Spoke to patient and relayed PA's message. 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 She would like to know if we could provide a more concrete answer with the given information? I told her I could ask but it really depends how she is doing on the 1st p/o appointment.

## 2022-05-27 NOTE — TELEPHONE ENCOUNTER
PT CALLED TO CHECK STATUS OF NOTE THAT WAS SENT YESTERDAY.  PT STATES SHE HAS TO FILL OUT FORMS FOR WORK AND NEEDS AN ESTIMATED TIME.    PLEASE CALL [phone]  THANK YOU

## 2022-05-31 ENCOUNTER — HOSPITAL ENCOUNTER (OUTPATIENT)
Facility: HOSPITAL | Age: 22
Discharge: HOME | End: 2022-05-31
Attending: NEUROLOGICAL SURGERY | Admitting: NEUROLOGICAL SURGERY

## 2022-05-31 ENCOUNTER — APPOINTMENT (OUTPATIENT)
Dept: GENERAL RADIOLOGY | Facility: HOSPITAL | Age: 22
End: 2022-05-31

## 2022-05-31 ENCOUNTER — ANESTHESIA (OUTPATIENT)
Dept: PERIOP | Facility: HOSPITAL | Age: 22
End: 2022-05-31

## 2022-05-31 VITALS
DIASTOLIC BLOOD PRESSURE: 50 MMHG | HEIGHT: 64 IN | RESPIRATION RATE: 16 BRPM | HEART RATE: 80 BPM | TEMPERATURE: 97.8 F | WEIGHT: 170 LBS | SYSTOLIC BLOOD PRESSURE: 115 MMHG | BODY MASS INDEX: 29.02 KG/M2 | OXYGEN SATURATION: 95 %

## 2022-05-31 DIAGNOSIS — M62.838 MUSCLE SPASM: Primary | ICD-10-CM

## 2022-05-31 DIAGNOSIS — M51.16 LUMBAR DISC DISEASE WITH RADICULOPATHY: ICD-10-CM

## 2022-05-31 DIAGNOSIS — M53.86 SCIATICA ASSOCIATED WITH DISORDER OF LUMBAR SPINE: ICD-10-CM

## 2022-05-31 LAB
B-HCG UR QL: NEGATIVE
EXPIRATION DATE: NORMAL
INTERNAL NEGATIVE CONTROL: NEGATIVE
INTERNAL POSITIVE CONTROL: POSITIVE
Lab: NORMAL
SARS-COV-2 RDRP RESP QL NAA+PROBE: NORMAL

## 2022-05-31 PROCEDURE — 25010000002 FENTANYL CITRATE (PF) 50 MCG/ML SOLUTION: Performed by: NURSE ANESTHETIST, CERTIFIED REGISTERED

## 2022-05-31 PROCEDURE — 25010000002 DEXAMETHASONE PER 1 MG: Performed by: NURSE ANESTHETIST, CERTIFIED REGISTERED

## 2022-05-31 PROCEDURE — 25010000002 PROPOFOL 10 MG/ML EMULSION: Performed by: NURSE ANESTHETIST, CERTIFIED REGISTERED

## 2022-05-31 PROCEDURE — 25010000002 ONDANSETRON PER 1 MG

## 2022-05-31 PROCEDURE — 25010000002 FENTANYL CITRATE (PF) 50 MCG/ML SOLUTION

## 2022-05-31 PROCEDURE — 25010000002 METHYLPREDNISOLONE PER 40 MG: Performed by: NEUROLOGICAL SURGERY

## 2022-05-31 PROCEDURE — 25010000002 DEXAMETHASONE SODIUM PHOSPHATE 10 MG/ML SOLUTION 1 ML VIAL: Performed by: NEUROLOGICAL SURGERY

## 2022-05-31 PROCEDURE — 63030 LAMOT DCMPRN NRV RT 1 LMBR: CPT | Performed by: PHYSICIAN ASSISTANT

## 2022-05-31 PROCEDURE — C9803 HOPD COVID-19 SPEC COLLECT: HCPCS

## 2022-05-31 PROCEDURE — 76000 FLUOROSCOPY <1 HR PHYS/QHP: CPT

## 2022-05-31 PROCEDURE — 63030 LAMOT DCMPRN NRV RT 1 LMBR: CPT | Performed by: NEUROLOGICAL SURGERY

## 2022-05-31 PROCEDURE — 25010000002 BUPRENORPHINE PER 0.1 MG: Performed by: NEUROLOGICAL SURGERY

## 2022-05-31 PROCEDURE — 81025 URINE PREGNANCY TEST: CPT | Performed by: ANESTHESIOLOGY

## 2022-05-31 PROCEDURE — 87635 SARS-COV-2 COVID-19 AMP PRB: CPT | Performed by: NEUROLOGICAL SURGERY

## 2022-05-31 PROCEDURE — 25010000002 NEOSTIGMINE 10 MG/10ML SOLUTION

## 2022-05-31 DEVICE — FLOSEAL HEMOSTATIC MATRIX, 10ML
Type: IMPLANTABLE DEVICE | Site: SPINE LUMBAR | Status: FUNCTIONAL
Brand: FLOSEAL HEMOSTATIC MATRIX

## 2022-05-31 DEVICE — HEMOST ABS SURGIFOAM SZ100 8X12 10MM: Type: IMPLANTABLE DEVICE | Site: SPINE LUMBAR | Status: FUNCTIONAL

## 2022-05-31 RX ORDER — IPRATROPIUM BROMIDE AND ALBUTEROL SULFATE 2.5; .5 MG/3ML; MG/3ML
3 SOLUTION RESPIRATORY (INHALATION) ONCE AS NEEDED
Status: DISCONTINUED | OUTPATIENT
Start: 2022-05-31 | End: 2022-05-31 | Stop reason: HOSPADM

## 2022-05-31 RX ORDER — SODIUM CHLORIDE 0.9 % (FLUSH) 0.9 %
10 SYRINGE (ML) INJECTION AS NEEDED
Status: DISCONTINUED | OUTPATIENT
Start: 2022-05-31 | End: 2022-05-31 | Stop reason: HOSPADM

## 2022-05-31 RX ORDER — SODIUM CHLORIDE 0.9 % (FLUSH) 0.9 %
10 SYRINGE (ML) INJECTION EVERY 12 HOURS SCHEDULED
Status: DISCONTINUED | OUTPATIENT
Start: 2022-05-31 | End: 2022-05-31 | Stop reason: HOSPADM

## 2022-05-31 RX ORDER — HYDROCODONE BITARTRATE AND ACETAMINOPHEN 5; 325 MG/1; MG/1
1 TABLET ORAL EVERY 4 HOURS PRN
Qty: 30 TABLET | Refills: 0 | Status: SHIPPED | OUTPATIENT
Start: 2022-05-31 | End: 2023-02-20

## 2022-05-31 RX ORDER — ROCURONIUM BROMIDE 10 MG/ML
INJECTION, SOLUTION INTRAVENOUS AS NEEDED
Status: DISCONTINUED | OUTPATIENT
Start: 2022-05-31 | End: 2022-05-31 | Stop reason: SURG

## 2022-05-31 RX ORDER — OXYCODONE AND ACETAMINOPHEN 7.5; 325 MG/1; MG/1
1 TABLET ORAL ONCE AS NEEDED
Status: DISCONTINUED | OUTPATIENT
Start: 2022-05-31 | End: 2022-05-31 | Stop reason: HOSPADM

## 2022-05-31 RX ORDER — HYDROCODONE BITARTRATE AND ACETAMINOPHEN 5; 325 MG/1; MG/1
TABLET ORAL
Status: COMPLETED
Start: 2022-05-31 | End: 2022-05-31

## 2022-05-31 RX ORDER — FENTANYL CITRATE 50 UG/ML
50 INJECTION, SOLUTION INTRAMUSCULAR; INTRAVENOUS
Status: DISCONTINUED | OUTPATIENT
Start: 2022-05-31 | End: 2022-05-31 | Stop reason: HOSPADM

## 2022-05-31 RX ORDER — PROPOFOL 10 MG/ML
VIAL (ML) INTRAVENOUS AS NEEDED
Status: DISCONTINUED | OUTPATIENT
Start: 2022-05-31 | End: 2022-05-31 | Stop reason: SURG

## 2022-05-31 RX ORDER — ACETAMINOPHEN 325 MG/1
650 TABLET ORAL ONCE
Status: COMPLETED | OUTPATIENT
Start: 2022-05-31 | End: 2022-05-31

## 2022-05-31 RX ORDER — ONDANSETRON 2 MG/ML
4 INJECTION INTRAMUSCULAR; INTRAVENOUS ONCE AS NEEDED
Status: DISCONTINUED | OUTPATIENT
Start: 2022-05-31 | End: 2022-05-31 | Stop reason: HOSPADM

## 2022-05-31 RX ORDER — ONDANSETRON 2 MG/ML
INJECTION INTRAMUSCULAR; INTRAVENOUS AS NEEDED
Status: DISCONTINUED | OUTPATIENT
Start: 2022-05-31 | End: 2022-05-31 | Stop reason: SURG

## 2022-05-31 RX ORDER — MEPERIDINE HYDROCHLORIDE 25 MG/ML
12.5 INJECTION INTRAMUSCULAR; INTRAVENOUS; SUBCUTANEOUS
Status: DISCONTINUED | OUTPATIENT
Start: 2022-05-31 | End: 2022-05-31 | Stop reason: HOSPADM

## 2022-05-31 RX ORDER — HYDROMORPHONE HYDROCHLORIDE 1 MG/ML
0.5 INJECTION, SOLUTION INTRAMUSCULAR; INTRAVENOUS; SUBCUTANEOUS
Status: DISCONTINUED | OUTPATIENT
Start: 2022-05-31 | End: 2022-05-31 | Stop reason: HOSPADM

## 2022-05-31 RX ORDER — NEOSTIGMINE METHYLSULFATE 1 MG/ML
INJECTION, SOLUTION INTRAVENOUS AS NEEDED
Status: DISCONTINUED | OUTPATIENT
Start: 2022-05-31 | End: 2022-05-31 | Stop reason: SURG

## 2022-05-31 RX ORDER — DEXAMETHASONE SODIUM PHOSPHATE 4 MG/ML
INJECTION, SOLUTION INTRA-ARTICULAR; INTRALESIONAL; INTRAMUSCULAR; INTRAVENOUS; SOFT TISSUE AS NEEDED
Status: DISCONTINUED | OUTPATIENT
Start: 2022-05-31 | End: 2022-05-31 | Stop reason: SURG

## 2022-05-31 RX ORDER — LIDOCAINE HYDROCHLORIDE 10 MG/ML
INJECTION, SOLUTION EPIDURAL; INFILTRATION; INTRACAUDAL; PERINEURAL AS NEEDED
Status: DISCONTINUED | OUTPATIENT
Start: 2022-05-31 | End: 2022-05-31 | Stop reason: SURG

## 2022-05-31 RX ORDER — ACETAMINOPHEN 325 MG/1
650 TABLET ORAL ONCE AS NEEDED
Status: DISCONTINUED | OUTPATIENT
Start: 2022-05-31 | End: 2022-05-31 | Stop reason: HOSPADM

## 2022-05-31 RX ORDER — GLYCOPYRROLATE 0.2 MG/ML
INJECTION INTRAMUSCULAR; INTRAVENOUS AS NEEDED
Status: DISCONTINUED | OUTPATIENT
Start: 2022-05-31 | End: 2022-05-31 | Stop reason: SURG

## 2022-05-31 RX ORDER — LIDOCAINE HYDROCHLORIDE 10 MG/ML
0.5 INJECTION, SOLUTION EPIDURAL; INFILTRATION; INTRACAUDAL; PERINEURAL ONCE AS NEEDED
Status: COMPLETED | OUTPATIENT
Start: 2022-05-31 | End: 2022-05-31

## 2022-05-31 RX ORDER — MAGNESIUM HYDROXIDE 1200 MG/15ML
LIQUID ORAL AS NEEDED
Status: DISCONTINUED | OUTPATIENT
Start: 2022-05-31 | End: 2022-05-31 | Stop reason: HOSPADM

## 2022-05-31 RX ORDER — ACETAMINOPHEN 650 MG/1
650 SUPPOSITORY RECTAL ONCE AS NEEDED
Status: DISCONTINUED | OUTPATIENT
Start: 2022-05-31 | End: 2022-05-31 | Stop reason: HOSPADM

## 2022-05-31 RX ORDER — FENTANYL CITRATE 50 UG/ML
INJECTION, SOLUTION INTRAMUSCULAR; INTRAVENOUS AS NEEDED
Status: DISCONTINUED | OUTPATIENT
Start: 2022-05-31 | End: 2022-05-31 | Stop reason: SURG

## 2022-05-31 RX ORDER — CEFAZOLIN SODIUM IN 0.9 % NACL 2 G/100 ML
2 PLASTIC BAG, INJECTION (ML) INTRAVENOUS ONCE
Status: COMPLETED | OUTPATIENT
Start: 2022-05-31 | End: 2022-05-31

## 2022-05-31 RX ORDER — FENTANYL CITRATE 50 UG/ML
INJECTION, SOLUTION INTRAMUSCULAR; INTRAVENOUS
Status: COMPLETED
Start: 2022-05-31 | End: 2022-05-31

## 2022-05-31 RX ORDER — HYDROCODONE BITARTRATE AND ACETAMINOPHEN 7.5; 325 MG/1; MG/1
1 TABLET ORAL ONCE
Status: COMPLETED | OUTPATIENT
Start: 2022-05-31 | End: 2022-05-31

## 2022-05-31 RX ORDER — HYDROCODONE BITARTRATE AND ACETAMINOPHEN 5; 325 MG/1; MG/1
1 TABLET ORAL ONCE AS NEEDED
Status: COMPLETED | OUTPATIENT
Start: 2022-05-31 | End: 2022-05-31

## 2022-05-31 RX ORDER — FAMOTIDINE 20 MG/1
20 TABLET, FILM COATED ORAL ONCE
Status: COMPLETED | OUTPATIENT
Start: 2022-05-31 | End: 2022-05-31

## 2022-05-31 RX ORDER — SODIUM CHLORIDE, SODIUM LACTATE, POTASSIUM CHLORIDE, CALCIUM CHLORIDE 600; 310; 30; 20 MG/100ML; MG/100ML; MG/100ML; MG/100ML
9 INJECTION, SOLUTION INTRAVENOUS CONTINUOUS
Status: DISCONTINUED | OUTPATIENT
Start: 2022-05-31 | End: 2022-05-31 | Stop reason: HOSPADM

## 2022-05-31 RX ORDER — SODIUM CHLORIDE, SODIUM LACTATE, POTASSIUM CHLORIDE, CALCIUM CHLORIDE 600; 310; 30; 20 MG/100ML; MG/100ML; MG/100ML; MG/100ML
9 INJECTION, SOLUTION INTRAVENOUS CONTINUOUS
Status: DISCONTINUED | OUTPATIENT
Start: 2022-05-31 | End: 2022-05-31

## 2022-05-31 RX ORDER — LIDOCAINE HYDROCHLORIDE AND EPINEPHRINE 5; 5 MG/ML; UG/ML
INJECTION, SOLUTION INFILTRATION; PERINEURAL AS NEEDED
Status: DISCONTINUED | OUTPATIENT
Start: 2022-05-31 | End: 2022-05-31 | Stop reason: HOSPADM

## 2022-05-31 RX ORDER — IBUPROFEN 800 MG/1
800 TABLET ORAL ONCE
Status: COMPLETED | OUTPATIENT
Start: 2022-05-31 | End: 2022-05-31

## 2022-05-31 RX ORDER — MIDAZOLAM HYDROCHLORIDE 1 MG/ML
1 INJECTION INTRAMUSCULAR; INTRAVENOUS
Status: DISCONTINUED | OUTPATIENT
Start: 2022-05-31 | End: 2022-05-31 | Stop reason: HOSPADM

## 2022-05-31 RX ORDER — METHYLPREDNISOLONE ACETATE 40 MG/ML
INJECTION, SUSPENSION INTRA-ARTICULAR; INTRALESIONAL; INTRAMUSCULAR; SOFT TISSUE AS NEEDED
Status: DISCONTINUED | OUTPATIENT
Start: 2022-05-31 | End: 2022-05-31 | Stop reason: HOSPADM

## 2022-05-31 RX ORDER — EPHEDRINE SULFATE 50 MG/ML
INJECTION, SOLUTION INTRAVENOUS AS NEEDED
Status: DISCONTINUED | OUTPATIENT
Start: 2022-05-31 | End: 2022-05-31 | Stop reason: SURG

## 2022-05-31 RX ORDER — OXYCODONE AND ACETAMINOPHEN 10; 325 MG/1; MG/1
1 TABLET ORAL EVERY 4 HOURS PRN
Status: DISCONTINUED | OUTPATIENT
Start: 2022-05-31 | End: 2022-05-31 | Stop reason: HOSPADM

## 2022-05-31 RX ORDER — METHOCARBAMOL 750 MG/1
750 TABLET, FILM COATED ORAL 4 TIMES DAILY PRN
Qty: 60 TABLET | Refills: 0 | Status: SHIPPED | OUTPATIENT
Start: 2022-05-31 | End: 2023-02-20

## 2022-05-31 RX ADMIN — DEXAMETHASONE SODIUM PHOSPHATE 8 MG: 4 INJECTION, SOLUTION INTRA-ARTICULAR; INTRALESIONAL; INTRAMUSCULAR; INTRAVENOUS; SOFT TISSUE at 12:23

## 2022-05-31 RX ADMIN — NEOSTIGMINE METHYLSULFATE 5 MG: 0.5 INJECTION INTRAVENOUS at 13:20

## 2022-05-31 RX ADMIN — FAMOTIDINE 20 MG: 20 TABLET ORAL at 11:28

## 2022-05-31 RX ADMIN — HYDROCODONE BITARTRATE AND ACETAMINOPHEN 1 TABLET: 5; 325 TABLET ORAL at 14:19

## 2022-05-31 RX ADMIN — SODIUM CHLORIDE, POTASSIUM CHLORIDE, SODIUM LACTATE AND CALCIUM CHLORIDE: 600; 310; 30; 20 INJECTION, SOLUTION INTRAVENOUS at 13:13

## 2022-05-31 RX ADMIN — CEFAZOLIN 2 G: 10 INJECTION, POWDER, FOR SOLUTION INTRAVENOUS at 12:10

## 2022-05-31 RX ADMIN — LIDOCAINE HYDROCHLORIDE 0.5 ML: 10 INJECTION, SOLUTION EPIDURAL; INFILTRATION; INTRACAUDAL; PERINEURAL at 11:29

## 2022-05-31 RX ADMIN — SODIUM CHLORIDE, POTASSIUM CHLORIDE, SODIUM LACTATE AND CALCIUM CHLORIDE 9 ML/HR: 600; 310; 30; 20 INJECTION, SOLUTION INTRAVENOUS at 11:34

## 2022-05-31 RX ADMIN — FENTANYL CITRATE 50 MCG: 50 INJECTION, SOLUTION INTRAMUSCULAR; INTRAVENOUS at 12:13

## 2022-05-31 RX ADMIN — GLYCOPYRROLATE 0.8 MG: 0.2 INJECTION INTRAMUSCULAR; INTRAVENOUS at 13:20

## 2022-05-31 RX ADMIN — ONDANSETRON 4 MG: 2 INJECTION INTRAMUSCULAR; INTRAVENOUS at 13:20

## 2022-05-31 RX ADMIN — SUGAMMADEX 200 MG: 100 INJECTION, SOLUTION INTRAVENOUS at 13:28

## 2022-05-31 RX ADMIN — FENTANYL CITRATE 50 MCG: 50 INJECTION, SOLUTION INTRAMUSCULAR; INTRAVENOUS at 13:55

## 2022-05-31 RX ADMIN — ROCURONIUM BROMIDE 50 MG: 10 INJECTION, SOLUTION INTRAVENOUS at 12:13

## 2022-05-31 RX ADMIN — LIDOCAINE HYDROCHLORIDE 50 MG: 10 INJECTION, SOLUTION EPIDURAL; INFILTRATION; INTRACAUDAL; PERINEURAL at 12:13

## 2022-05-31 RX ADMIN — HYDROCODONE BITARTRATE AND ACETAMINOPHEN 1 TABLET: 7.5; 325 TABLET ORAL at 11:28

## 2022-05-31 RX ADMIN — PROPOFOL 200 MG: 10 INJECTION, EMULSION INTRAVENOUS at 12:13

## 2022-05-31 RX ADMIN — ACETAMINOPHEN 650 MG: 325 TABLET ORAL at 11:28

## 2022-05-31 RX ADMIN — FENTANYL CITRATE 50 MCG: 50 INJECTION, SOLUTION INTRAMUSCULAR; INTRAVENOUS at 12:10

## 2022-05-31 RX ADMIN — EPHEDRINE SULFATE 10 MG: 50 INJECTION INTRAVENOUS at 12:46

## 2022-05-31 RX ADMIN — IBUPROFEN 800 MG: 800 TABLET, FILM COATED ORAL at 11:28

## 2022-06-30 ENCOUNTER — OFFICE VISIT (OUTPATIENT)
Dept: NEUROSURGERY | Facility: CLINIC | Age: 22
End: 2022-06-30

## 2022-06-30 VITALS
WEIGHT: 176.6 LBS | HEIGHT: 64 IN | SYSTOLIC BLOOD PRESSURE: 102 MMHG | TEMPERATURE: 97.3 F | DIASTOLIC BLOOD PRESSURE: 60 MMHG | BODY MASS INDEX: 30.15 KG/M2

## 2022-06-30 DIAGNOSIS — M51.16 LUMBAR DISC HERNIATION WITH RADICULOPATHY: Primary | ICD-10-CM

## 2022-06-30 PROCEDURE — 99024 POSTOP FOLLOW-UP VISIT: CPT | Performed by: PHYSICIAN ASSISTANT

## 2022-06-30 NOTE — PROGRESS NOTES
Subjective   Patient ID: Luz Maria Colmenares is a 22 y.o. female is here today for follow-up for wound check.    HPI:      Patient is very sweet 22-year-old female known to the neurosurgical practice for having 1 year of left leg pain as well as mild foot drop.  As such patient was deemed surgical candidate by Dr. Sathish Sykes was taken to the OR on 5/31/2022 for a left L4-5 microdiscectomy.    Procedure went without event patient is back today for wound check.    Patient's pain is about 85% better and she notes that she is able to walk for distances that she could not walk prior to surgery.  Patient also notes improvement of the left foot drop.    The following portions of the patient's history were reviewed and updated as appropriate: allergies, current medications, past family history, past medical history, past social history, past surgical history and problem list.    Review of Systems   Constitutional: Negative for activity change, appetite change, chills, diaphoresis, fatigue, fever and unexpected weight change.   HENT: Negative for congestion, dental problem, drooling, ear discharge, ear pain, facial swelling, hearing loss, mouth sores, nosebleeds, postnasal drip, rhinorrhea, sinus pressure, sinus pain, sneezing, sore throat, tinnitus, trouble swallowing and voice change.    Eyes: Negative for photophobia, pain, discharge, redness, itching and visual disturbance.   Respiratory: Negative for apnea, cough, choking, chest tightness, shortness of breath, wheezing and stridor.    Cardiovascular: Negative for chest pain, palpitations and leg swelling.   Gastrointestinal: Negative for abdominal distention, abdominal pain, anal bleeding, blood in stool, constipation, diarrhea, nausea, rectal pain and vomiting.   Endocrine: Negative for cold intolerance, heat intolerance, polydipsia, polyphagia and polyuria.   Genitourinary: Negative for decreased urine volume, difficulty urinating, dyspareunia, dysuria,  "enuresis, flank pain, frequency, genital sores, hematuria, menstrual problem, pelvic pain, urgency, vaginal bleeding, vaginal discharge and vaginal pain.   Musculoskeletal: Positive for myalgias. Negative for arthralgias, back pain, gait problem, joint swelling, neck pain and neck stiffness.   Skin: Negative for color change, pallor, rash and wound.   Allergic/Immunologic: Negative for environmental allergies, food allergies and immunocompromised state.   Neurological: Negative for dizziness, tremors, seizures, syncope, facial asymmetry, speech difficulty, weakness, light-headedness, numbness and headaches.   Hematological: Negative for adenopathy. Does not bruise/bleed easily.   Psychiatric/Behavioral: Negative for agitation, behavioral problems, confusion, decreased concentration, dysphoric mood, hallucinations, self-injury, sleep disturbance and suicidal ideas. The patient is not nervous/anxious and is not hyperactive.          Objective    reports that she has been smoking cigarettes. She has a 3.50 pack-year smoking history. She has never used smokeless tobacco. She reports that she does not drink alcohol and does not use drugs.   SMOKING STATUS: Non-smoker    Physical Exam:   Vitals:/60 (BP Location: Right arm, Patient Position: Sitting, Cuff Size: Adult)   Temp 97.3 °F (36.3 °C)   Ht 162.6 cm (64\")   Wt 80.1 kg (176 lb 9.6 oz)   BMI 30.31 kg/m²    BMI: Body mass index is 30.31 kg/m².       Incision:   The incision is well-healed and well approximated.  No signs of infection, bleeding, or erythema.    Musculoskeletal:                                            Dorsiflexion is 5/5 Bilaterally                    Plantarflexion is 5/5 bilaterally                    Hip Flexion 5/5 bilaterally.                        Neurologic:                                         The patient is alert and oriented by 3.                       Sensation is equal bilaterally with no deficit.                        " Reflexes:  2+ throughout       Assessment & Plan   Independent Review of Radiographic Studies:    No new films reviewed at this visit  Medical Decision Making:    At this point the patient is doing very well.  The patient is pleased with postsurgical outcome.  With the resolution of pain, I believe that it is adequate to see the patient back on an as-needed basis.  The patient has been educated on reasons that would necessitate a referral back to us in a more urgent manner.  The patient understands of his instructions and limitations for the best post-operative success.    It has been a pleasure providing neurosurgical care.    John Black PA-C    BMI is >= 30 and <35. (Class 1 Obesity). The following options were offered after discussion;: weight loss educational material (shared in after visit summary)    Diagnoses and all orders for this visit:    1. Lumbar disc herniation with radiculopathy (Primary)      No follow-ups on file.

## 2022-07-01 ENCOUNTER — TELEPHONE (OUTPATIENT)
Dept: NEUROSURGERY | Facility: CLINIC | Age: 22
End: 2022-07-01

## 2022-07-01 NOTE — TELEPHONE ENCOUNTER
"Caller: Luz Maria Colmenares    Relationship: Self    Best call back number: [phone]    What form or medical record are you requesting: PHYSICAL REQUIREMENTS/JOB DESCRIPTION/PHYSICIAN ADDENDUM    Who is requesting this form or medical record from you: [employer] - EMPLOYER     How would you like to receive the form or medical records (pick-up, mail, fax): NO FAX RETURN # INDICATED ON FORM; PATIENT WOULD LIKE IT UPLOADED TO LETTERS IN HER MYCHART FOR HER DOWNLOAD/PRINTING; ONCE COMPLETED, IF AUTHORIZED, PLEASE SIGN AND CONTACT PATIENT TO LET HER KNOW ITS READY AND IN HER MYCHART.     Timeframe paperwork needed: \"BOSS TOLD ME YESTERDAY, THIS NEEDS TO BE SIGNED AND BACK TO US BEFORE YOU CAN BE PUT BACK ON THE SCHEDULE\" - PATIENT STATES NEEDED ASAP.     Additional notes: PATIENT STATES, \"SHE TOTALLY FORGOT TO GET THE [facility] SIGNED AND TAKEN CARE OF DURING HER LAST APPT WITH KEHINDE SANCHEZ PA-C.\"      ADVISED PATIENT TO FAX TO [facility] [phone].     THANK YOU VERY MUCH.   "

## 2023-02-20 ENCOUNTER — OFFICE VISIT (OUTPATIENT)
Dept: NEUROSURGERY | Facility: CLINIC | Age: 23
End: 2023-02-20
Payer: COMMERCIAL

## 2023-02-20 VITALS — WEIGHT: 169.6 LBS | RESPIRATION RATE: 17 BRPM | HEIGHT: 64 IN | BODY MASS INDEX: 28.95 KG/M2

## 2023-02-20 DIAGNOSIS — M53.86 SCIATICA ASSOCIATED WITH DISORDER OF LUMBAR SPINE: Primary | ICD-10-CM

## 2023-02-20 PROCEDURE — 99213 OFFICE O/P EST LOW 20 MIN: CPT | Performed by: PHYSICIAN ASSISTANT

## 2023-02-20 NOTE — PROGRESS NOTES
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    Patient has had 2 episodes in the last 4 months where she has sharp stabbing pain.  Patient takes muscle relaxers and rest over a number of days and the pains have subsided fairly readily.  Patient is here today with no pain.    We had discussion about mitigation of anxiety and stressors as it pertains to her surgical history.  Patient understands that the discectomy that she had for the foot drop on the left is incredibly rare and would not likely occur again however if she has persistent symptoms I encouraged her to give us a call.    If she needs intermittent muscle relaxers I be happy to give this to her    Review of Systems   Constitutional: Negative for activity change, appetite change, chills, diaphoresis, fatigue, fever and unexpected weight change.   HENT: Negative for congestion, dental problem, drooling, ear discharge, ear pain, facial swelling, hearing loss, mouth sores, nosebleeds, postnasal drip, rhinorrhea, sinus pressure, sneezing, sore throat, tinnitus, trouble swallowing and voice change.    Eyes: Negative for photophobia, pain, discharge, redness, itching and visual disturbance.   Respiratory: Negative for apnea, cough, choking, chest tightness, shortness of breath, wheezing and stridor.    Cardiovascular: Negative for chest pain, palpitations and leg swelling.   Gastrointestinal: Negative for abdominal distention, abdominal pain, anal bleeding, blood in stool, constipation, diarrhea, nausea, rectal pain and vomiting.   Endocrine: Negative for cold intolerance, heat intolerance, polydipsia, polyphagia and polyuria.   Genitourinary: Negative for  decreased urine volume, difficulty urinating, dysuria, enuresis, flank pain, frequency, genital sores, hematuria and urgency.   Musculoskeletal: Positive for back pain. Negative for arthralgias, gait problem, joint swelling, myalgias, neck pain and neck stiffness.   Skin: Negative for color change, pallor, rash and wound.   Allergic/Immunologic: Negative for environmental allergies, food allergies and immunocompromised state.   Neurological: Negative for dizziness, tremors, seizures, syncope, facial asymmetry, speech difficulty, weakness, light-headedness, numbness and headaches.   Hematological: Negative for adenopathy. Does not bruise/bleed easily.   Psychiatric/Behavioral: Negative for agitation, behavioral problems, confusion, decreased concentration, dysphoric mood, hallucinations, self-injury, sleep disturbance and suicidal ideas. The patient is not nervous/anxious and is not hyperactive.    All other systems reviewed and are negative.      Past Medical History:     Past Medical History:   Diagnosis Date   • Asthma    • GERD (gastroesophageal reflux disease)    • Low back pain    • Sciatica associated with disorder of lumbar spine 05/12/2022       Family History:     Family History   Family history unknown: Yes       Social History:    reports that she has been smoking cigarettes. She has a 3.50 pack-year smoking history. She has never used smokeless tobacco. She reports that she does not drink alcohol and does not use drugs.   SMOKING STATUS: I spent greater than 10 minutes educating the patient on smoking cessation, and discussed how smoking pertains to the particular disease process at hand.  The patient acknowledges understanding.      Surgical History:     Past Surgical History:   Procedure Laterality Date   • EPIDURAL BLOCK  November 29, 2021   • LUMBAR DISCECTOMY Left 5/31/2022    Procedure: lumbar microdiscectomy Left-sided L4-5;  Surgeon: Sathish Sykes MD;  Location: [facility];  Service:  "Neurosurgery;  Laterality: Left;   • TONSILLECTOMY AND ADENOIDECTOMY         Allergies:   Patient has no known allergies.    Physical Exam:    Vital Signs:Resp 17   Ht 162.6 cm (64\")   Wt 76.9 kg (169 lb 9.6 oz)   BMI 29.11 kg/m²    BMI: Body mass index is 29.11 kg/m².       Incision:   The incision is well-healed and well approximated.  No signs of infection, bleeding, or erythema.    Musculoskeletal:                                            Dorsiflexion is 5/5 Bilaterally                    Plantarflexion is 5/5 bilaterally                    Hip Flexion 5/5 bilaterally.                        Neurologic:                                         The patient is alert and oriented by 3.                       Sensation is equal bilaterally with no deficit.                        Reflexes:  2+ throughout       Medical Decision Making    Data Review:   No new films reviewed at this visit    Diagnosis:   Low back pain  Status post left L4-5 microdiscectomy May 31, 2022    Treatment Options:   We will see the patient back on an as-needed basis.  Patient has no symptoms currently.    If she were to need muscle relaxers for a another spell I be happy to provide her with those.  Patient understands if symptoms become persistent she should get in contact with us so we can get appropriate imaging.    BMI is >= 25 and <30. (Overweight) The following options were offered after discussion;: weight loss educational material (shared in after visit summary)     Diagnosis Plan   1. Sciatica associated with disorder of lumbar spine          "

## (undated) DEVICE — PK NEURO DISC 10

## (undated) DEVICE — 3M™ STERI-DRAPE™ INSTRUMENT POUCH 1018: Brand: STERI-DRAPE™

## (undated) DEVICE — C-ARM: Brand: UNBRANDED

## (undated) DEVICE — SYR CONTRL LUERLOK 10CC

## (undated) DEVICE — ELECTRD BLD EZ CLN MOD XLNG 2.75IN

## (undated) DEVICE — PAD ARMBRD SURG CONVOL 7.5X20X2IN

## (undated) DEVICE — 1010 S-DRAPE TOWEL DRAPE 10/BX: Brand: STERI-DRAPE™

## (undated) DEVICE — Device

## (undated) DEVICE — ELECTRD BLD EZ CLN MOD 6.5IN

## (undated) DEVICE — HDRST INTUB GENTLETOUCH SLOT 7IN RT

## (undated) DEVICE — STRAP POSTN KN/BDY FM 5X72IN DISP

## (undated) DEVICE — 3M™ STERI-STRIP™ REINFORCED ADHESIVE SKIN CLOSURES, R1547, 1/2 IN X 4 IN (12 MM X 100 MM), 6 STRIPS/ENVELOPE: Brand: 3M™ STERI-STRIP™

## (undated) DEVICE — BLANKT WARM UPPR/BDY ARM/OUT 57X196CM

## (undated) DEVICE — GLV SURG PREMIERPRO MIC LTX PF SZ8 BRN

## (undated) DEVICE — PATIENT RETURN ELECTRODE, SINGLE-USE, CONTACT QUALITY MONITORING, ADULT, WITH 9FT CORD, FOR PATIENTS WEIGING OVER 33LBS. (15KG): Brand: MEGADYNE

## (undated) DEVICE — DRSNG WND BORDR/ADHS NONADHR/GZ LF 4X4IN STRL

## (undated) DEVICE — SOL ISO/ALC RUB 70PCT 4OZ

## (undated) DEVICE — DRSNG SURESITE123 4X4.8IN

## (undated) DEVICE — ADHS LIQ MASTISOL 2/3ML

## (undated) DEVICE — TOOL MR8-T12MH25M MR8 12CM T M 2FL 2.5MM: Brand: MIDAS REX MR8

## (undated) DEVICE — SUT VIC 0 UR6 27IN VCP603H

## (undated) DEVICE — DRP MICROSCOPE 4 BINOCULAR CV 54X150IN

## (undated) DEVICE — SPNG GZ WOVN 4X4IN 12PLY 10/BX STRL

## (undated) DEVICE — ANTIBACTERIAL UNDYED BRAIDED (POLYGLACTIN 910), SYNTHETIC ABSORBABLE SUTURE: Brand: COATED VICRYL

## (undated) DEVICE — GLV SURG BIOGEL LTX PF 8

## (undated) DEVICE — NDL HYPO ECLPS SFTY 25G 1 1/2IN

## (undated) DEVICE — NEEDLE, QUINCKE 22GX3.5": Brand: MEDLINE INDUSTRIES, INC.

## (undated) DEVICE — SCRB SURG BACTOSHIELD CHG 4PCT 4OZ